# Patient Record
Sex: MALE | Race: WHITE | NOT HISPANIC OR LATINO | Employment: FULL TIME | URBAN - METROPOLITAN AREA
[De-identification: names, ages, dates, MRNs, and addresses within clinical notes are randomized per-mention and may not be internally consistent; named-entity substitution may affect disease eponyms.]

---

## 2023-08-08 ENCOUNTER — OFFICE VISIT (OUTPATIENT)
Dept: FAMILY MEDICINE | Facility: CLINIC | Age: 28
End: 2023-08-08

## 2023-08-08 VITALS
RESPIRATION RATE: 17 BRPM | SYSTOLIC BLOOD PRESSURE: 126 MMHG | HEART RATE: 140 BPM | BODY MASS INDEX: 32.74 KG/M2 | TEMPERATURE: 97.7 F | OXYGEN SATURATION: 98 % | DIASTOLIC BLOOD PRESSURE: 82 MMHG | WEIGHT: 269 LBS

## 2023-08-08 DIAGNOSIS — S39.012A STRAIN OF LUMBAR REGION, INITIAL ENCOUNTER: Primary | ICD-10-CM

## 2023-08-08 PROCEDURE — 99204 OFFICE O/P NEW MOD 45 MIN: CPT | Performed by: PHYSICIAN ASSISTANT

## 2023-08-08 RX ORDER — MELOXICAM 15 MG/1
15 TABLET ORAL DAILY
Qty: 14 TABLET | Refills: 0 | Status: SHIPPED | OUTPATIENT
Start: 2023-08-08 | End: 2023-08-22

## 2023-08-08 RX ORDER — COVID-19 ANTIGEN TEST
220 KIT MISCELLANEOUS 2 TIMES DAILY WITH MEALS
COMMUNITY

## 2023-08-08 RX ORDER — CYCLOBENZAPRINE HCL 10 MG
10 TABLET ORAL
Qty: 14 TABLET | Refills: 0 | Status: SHIPPED | OUTPATIENT
Start: 2023-08-08 | End: 2023-08-22

## 2023-08-08 NOTE — PROGRESS NOTES
Patient presents with:  back injury - yesterday: Pain       Clinical Decision Making:  Patient is treated for left sciatica with prescription anti-inflammatory with Mobic and cyclobenzaprine.  Risks and benefits of medication were gone over to include impairment and sedation. Expected course of resolution and indication for return was gone over and questions were answered to patient/parent's satisfaction before discharge. Work restriction was written.    30 min spent on the date of the encounter in chart review, patient visit, review of tests, documentation and/ordiscussion with other providers about the issues documented above.        ICD-10-CM    1. Strain of lumbar region, initial encounter  S39.012A meloxicam (MOBIC) 15 MG tablet     cyclobenzaprine (FLEXERIL) 10 MG tablet          Patient Instructions   Your back pain from lumbar muscle strain.     I have prescribed you a short course of antiinflammatory to help decrease the inflammation in the area of the spasm. This should help to decrease associated numbness/tingling (nerve pain). Please take as directed.    Do not take OTC ibuprofen or NSAIDs while on the Mobic. If having pain, take Tylenol up to 1,000 mg, 4 times daily.    You may use the Flexeril as needed for muscle spasm. Use caution while taking this medication, as it can make you drowsy. Do not take while driving, operating heavy machinery, or doing any activities requiring intense concentration.    Try using a heating pad and/or warm baths.    Make sure to keep moving to avoid getting stiff. See below for stretching exercises.    If you develop fever, severe pain that prevents you from walking at all, weakness of your arms or legs, loss of bowel or bladder continence, or any other new concerning symptoms, go to the ER immediately.    Otherwise, follow up with primary care doctor as needed or if no improvement in pain in symptoms in 1 week.         HPI:  Saji Lozano is a 28 year old male who  presents today for work comp related injury to the lower lumbar back.  Patient works at [employer] in [place], [place]. Date of injury was yesterday 8/7/2023.  Patient was installing a base and a sink and was lifting and felt a pull in his lower back.  Pain is roughly a 1 out of 10.  Sitting is the worst position lying is the best position.  Patient has had some radiation from the lower back into the buttock but not into the lower extremities.  Treatment at home is consisted of Aleve 1 tablet/day without relief.  Patient has had only previous self-limited lower back strain without sequela.  No reported fever, fecal or urinary incontinence or weakness in the lower extremities.    History obtained from chart review and the patient.    Problem List:  There are no relevant problems documented for this patient.      No past medical history on file.    Social History     Tobacco Use    Smoking status: Every Day     Types: Cigarettes    Smokeless tobacco: Never   Substance Use Topics    Alcohol use: Not on file       Review of Systems  As above in HPI otherwise negative.    Vitals:    08/08/23 1036   BP: 126/82   Pulse: (!) 140   Resp: 17   Temp: 97.7  F (36.5  C)   SpO2: 98%   Weight: 122 kg (269 lb)     General: Patient is slightly uncomfortable in the office encounter secondary to pain.  Patient ambulates into the office encounter is able to walk into the office encounter sit in the chair stand up and walk to the examination table.    Musculoskeletal:  No noted deformities and the alignment is midline.    Palpation: No cervical thoracic lumbar sacral spinous process tenderness to palpation  Paraspinal muscles are palpated.    There is slight paraspinal muscle tenderness on the right side.  SI joint on the right side is tender to palpation and does reproduce symptoms radiates into the buttock into the lateral aspect of the thigh to the mid thigh region and down to the foot on the lateral aspect in an S1    Range  of motion: She has extension to 20 degrees and flexion to 90 degrees although touching toes with flexion is painful for the patient and does reproduce symptoms    Reflexes: DTRs are 2 out of 4 and equal bilaterally at the Achilles and patella.    Musculoskeletal strength: 5 out of 5 and equal bilaterally.  Straight leg raises reproduce pain on the left side  Patient is able to arise on the heels as well as balls of the feet.      Physical Exam      At the end of the encounter, I discussed results, diagnosis, medications. Discussed red flags for immediate return to clinic/ER, as well as indications for follow up if no improvement. Patient understood and agreed to plan. Patient was stable for discharge.

## 2023-08-08 NOTE — LETTER
August 8, 2023      Saji Lozano  [address]  [facility] [address]        To Whom It May Concern:    Saji Lozano was seen in our clinic. He may return to work with the following: limited to light duty - lifting no greater than 10 pounds, no use of arms over shoulders, no bending/stooping, no climbing, standing limited to 1 hrs, walking limited to 1 hrs, and no working waist to floor or crawling on or about 08/15/2023.  May resume full activities as tolerated after 1 week.       Sincerely,        Suman Ba PA-C

## 2023-08-08 NOTE — PATIENT INSTRUCTIONS
Your back pain from lumbar muscle strain.     I have prescribed you a short course of antiinflammatory to help decrease the inflammation in the area of the spasm. This should help to decrease associated numbness/tingling (nerve pain). Please take as directed.    Do not take OTC ibuprofen or NSAIDs while on the Mobic. If having pain, take Tylenol up to 1,000 mg, 4 times daily.    You may use the Flexeril as needed for muscle spasm. Use caution while taking this medication, as it can make you drowsy. Do not take while driving, operating heavy machinery, or doing any activities requiring intense concentration.    Try using a heating pad and/or warm baths.    Make sure to keep moving to avoid getting stiff. See below for stretching exercises.    If you develop fever, severe pain that prevents you from walking at all, weakness of your arms or legs, loss of bowel or bladder continence, or any other new concerning symptoms, go to the ER immediately.    Otherwise, follow up with primary care doctor as needed or if no improvement in pain in symptoms in 1 week.